# Patient Record
(demographics unavailable — no encounter records)

---

## 2025-07-10 NOTE — ASSESSMENT
[FreeTextEntry1] : s/p vertebral fractures.  Very low bone density.   Risk factors include low body weight and age. Because she has had fragility fractures and has very low T scores, I recommended anabolic therapy, followed by antiresorptive therapy.   Options include Evenity injections every month for 1 year, or daily teriparatide injections for 2 years for anabolic therapy, and then followed by zoledronic acid infusions yearly (for 2-3 years, I anticipate).  She doesn't want Prolia. I recommended adding calcium supplementation, in addition to vitamin D. PT referral to improve strength and balance. check labs today (will not use teriparatide if calcium is elevated). RTO 6-12 months

## 2025-07-10 NOTE — PHYSICAL EXAM
[Alert] : alert [No Acute Distress] : no acute distress [Normal Hearing] : hearing was normal [No LAD] : no lymphadenopathy [Clear to Auscultation] : lungs were clear to auscultation bilaterally [Normal S1, S2] : normal S1 and S2 [Regular Rhythm] : with a regular rhythm [Kyphosis] : kyphosis present [No Stigmata of Cushings Syndrome] : no stigmata of Cushings Syndrome [Normal Affect] : the affect was normal [Normal Mood] : the mood was normal [Acanthosis Nigricans] : no acanthosis nigricans [de-identified] : thyroid not palpable [de-identified] : severe kyphosis

## 2025-07-10 NOTE — PHYSICAL EXAM
[Alert] : alert [No Acute Distress] : no acute distress [Normal Hearing] : hearing was normal [No LAD] : no lymphadenopathy [Clear to Auscultation] : lungs were clear to auscultation bilaterally [Normal S1, S2] : normal S1 and S2 [Regular Rhythm] : with a regular rhythm [Kyphosis] : kyphosis present [No Stigmata of Cushings Syndrome] : no stigmata of Cushings Syndrome [Normal Affect] : the affect was normal [Normal Mood] : the mood was normal [Acanthosis Nigricans] : no acanthosis nigricans [de-identified] : thyroid not palpable [de-identified] : severe kyphosis

## 2025-07-10 NOTE — REASON FOR VISIT
[Hypothyroidism] : hypothyroidism [Osteoporosis] : osteoporosis [Family Member] : family member [Initial Evaluation] : an initial evaluation

## 2025-07-10 NOTE — HISTORY OF PRESENT ILLNESS
[FreeTextEntry1] : here with daughter. She had multiple vertebral fractures in April that occurred spontaneously (no fall) and had kyphoplasty in June, was told she has to see endocrine to figure out her bones. She was rx'd Prolia, did 5 injections, with last one being one year ago.  After the last injection, she couldn't move, felt paralyzed so she stopped doing the injection. no FH of hip fracture.  No prior fractures.  No early menopause. Not prone to falls. Diet does not contain much calcium.  She supplements vitamin D but not calcium. She has RA, has not needed steroids.   Treated with methotrexate.  She had swelling of knees and feet and had drainage performed. bone density reviewed from 10/23:  T scores -3.8 in spine, -3.1 in hip. no history of RT  PMH:  osteoporosis s/p vertebral fractures (T7, T8, L1).  s/p kyphoplasty (6/25) Hashimoto's  Meds levothyroxine 88mcg/day vitamin D, K2